# Patient Record
Sex: MALE | URBAN - METROPOLITAN AREA
[De-identification: names, ages, dates, MRNs, and addresses within clinical notes are randomized per-mention and may not be internally consistent; named-entity substitution may affect disease eponyms.]

---

## 2024-11-07 ENCOUNTER — OFFICE VISIT (OUTPATIENT)
Dept: URGENT CARE | Age: 64
End: 2024-11-07

## 2024-11-07 VITALS
HEART RATE: 63 BPM | OXYGEN SATURATION: 93 % | TEMPERATURE: 98.3 F | SYSTOLIC BLOOD PRESSURE: 134 MMHG | WEIGHT: 220 LBS | RESPIRATION RATE: 18 BRPM | DIASTOLIC BLOOD PRESSURE: 77 MMHG

## 2024-11-07 DIAGNOSIS — L03.213 PERIORBITAL CELLULITIS OF RIGHT EYE: ICD-10-CM

## 2024-11-07 DIAGNOSIS — H00.011 HORDEOLUM EXTERNUM OF RIGHT UPPER EYELID: Primary | ICD-10-CM

## 2024-11-07 PROCEDURE — 99203 OFFICE O/P NEW LOW 30 MIN: CPT | Performed by: STUDENT IN AN ORGANIZED HEALTH CARE EDUCATION/TRAINING PROGRAM

## 2024-11-07 PROCEDURE — 1036F TOBACCO NON-USER: CPT | Performed by: STUDENT IN AN ORGANIZED HEALTH CARE EDUCATION/TRAINING PROGRAM

## 2024-11-07 RX ORDER — METFORMIN HYDROCHLORIDE 500 MG/1
500 TABLET ORAL
COMMUNITY

## 2024-11-07 RX ORDER — ESCITALOPRAM OXALATE 5 MG/5ML
10 SOLUTION ORAL DAILY
COMMUNITY

## 2024-11-07 RX ORDER — EMTRICITABINE AND TENOFOVIR DISOPROXIL FUMARATE 200; 300 MG/1; MG/1
1 TABLET, FILM COATED ORAL DAILY
COMMUNITY

## 2024-11-07 RX ORDER — OLMESARTAN MEDOXOMIL 5 MG/1
5 TABLET ORAL DAILY
COMMUNITY

## 2024-11-07 RX ORDER — INSULIN GLARGINE 100 [IU]/ML
INJECTION, SOLUTION SUBCUTANEOUS EVERY 24 HOURS
COMMUNITY

## 2024-11-07 RX ORDER — CEPHALEXIN 500 MG/1
500 CAPSULE ORAL 2 TIMES DAILY
Qty: 14 CAPSULE | Refills: 0 | Status: SHIPPED | OUTPATIENT
Start: 2024-11-07 | End: 2024-11-08

## 2024-11-07 RX ORDER — METOPROLOL SUCCINATE 25 MG/1
25 TABLET, EXTENDED RELEASE ORAL DAILY
COMMUNITY

## 2024-11-07 RX ORDER — SOTALOL HYDROCHLORIDE 80 MG/1
TABLET ORAL
COMMUNITY

## 2024-11-07 RX ORDER — OFLOXACIN 3 MG/ML
1 SOLUTION/ DROPS OPHTHALMIC 4 TIMES DAILY
Qty: 10 ML | Refills: 0 | Status: SHIPPED | OUTPATIENT
Start: 2024-11-07 | End: 2024-11-14

## 2024-11-07 RX ORDER — EZETIMIBE 10 MG/1
10 TABLET ORAL DAILY
COMMUNITY

## 2024-11-07 RX ORDER — NITROGLYCERIN 0.3 MG/1
0.3 TABLET SUBLINGUAL EVERY 5 MIN PRN
COMMUNITY

## 2024-11-07 RX ORDER — ROSUVASTATIN CALCIUM 5 MG/1
5 TABLET, COATED ORAL DAILY
COMMUNITY

## 2024-11-07 RX ORDER — COLCHICINE 0.6 MG/1
TABLET ORAL DAILY
COMMUNITY

## 2024-11-07 RX ORDER — ASPIRIN 81 MG/1
81 TABLET ORAL DAILY
COMMUNITY

## 2024-11-07 RX ORDER — INSULIN LISPRO 100 [IU]/ML
INJECTION, SUSPENSION SUBCUTANEOUS
COMMUNITY

## 2024-11-07 RX ORDER — PIOGLITAZONEHYDROCHLORIDE 15 MG/1
15 TABLET ORAL DAILY
COMMUNITY

## 2024-11-07 NOTE — PATIENT INSTRUCTIONS
Follow up with PCP and established Ophthalmologist. We advised seeking immediate emergency medical attention if symptoms fail to improve, worsen or any concerning symptoms arise. Patient voiced full understanding and agreement to plan.

## 2024-11-07 NOTE — PROGRESS NOTES
Subjective   Patient ID: Sohan Everett is a 63 y.o. male. They present today with a chief complaint of Other (Stye on right eye x2 weeks ).    History of Present Illness  Sohan is a 63-year-old male who presents to the urgent care for evaluation of right eye redness and possible stye formation for 2 weeks duration.  Patient states he has an established eye specialist who he follows with for this back in [place] however he is in the area for work and has not been able to follow-up with his specialist for treatment.  Patient states he gets these often and this 1 is lingering.  Patient describes purulent drainage with the right upper eyelid swelling and redness and tenderness.  Patient denies visual disturbances, fever or facial pain or difficulty moving the eye.  Patient is seeking evaluation reassurance.  No trauma reported.  No other symptoms or concerns otherwise reported.    Past Medical History  Allergies as of 11/07/2024 - Reviewed 11/07/2024   Allergen Reaction Noted    Gabapentin Other 11/07/2024    Sulfa (sulfonamide antibiotics) Hives 11/07/2024       (Not in a hospital admission)       History reviewed. No pertinent past medical history.    History reviewed. No pertinent surgical history.     reports that he has never smoked. He has never been exposed to tobacco smoke. He has never used smokeless tobacco.    Review of Systems  A 10-point review of systems was performed, otherwise unremarkable unless stated in the history of present illness.                Objective    Vitals:    11/07/24 1640   BP: 134/77   Pulse: 63   Resp: 18   Temp: 36.8 °C (98.3 °F)   SpO2: 93%   Weight: 99.8 kg (220 lb)     No LMP for male patient.    Gen: Vitals noted and reviewed, no evidence of acute distress, well developed and afebrile.   Psych: Mood and affect appropriate for setting.  Head/Face: Atraumatic and normocephalic.   Neuro: No focal deficits noted.  Eyes: Right eyelid with erythema and papule of upper lid  with surrounding erythema periorbitally. No erythema involving the septum. No vesicles or pustules noted. Purulent drainage noted on right without hemorrhage. EOMI. Conjunctival injection on the right. No icterus noted. Left eye within normal limits  ENT: TMs clear bilaterally, EACs unremarkable. Mastoids non-tender. Posterior oropharynx without erythema, exudate, or swelling. Uvula is in the midline and non-edematous.   Neck: Supple. No meningismus through full range of motion. No lymphadenopathy.   Cardiac: Regular rate and rhythm no murmur.   Lungs: Clear to auscultation throughout, No evidence of wheezing, rhonchi or crackles. Good aeration throughout.   Extremities: Symmetrical, No peripheral edema  Skin: Without evidence of ecchymosis, wounds, or rashes.      Point of Care Test & Imaging Results from this visit  No results found for this visit on 11/07/24.   No results found.    Diagnostic study results (if any) were reviewed by Yolanda Wong DO.    Assessment/Plan   Allergies, medications, history, and pertinent labs/EKGs/Imaging reviewed by Yolanda Wong DO.     Medical Decision Making  Discussed with the patient symptoms and clinical presentation findings suggestive of right upper eyelid hordeolum/stye with questionable developing periorbital cellulitis.  Reviewed patient medication allergies and prescribed ofloxacin for ophthalmic use along with cephalexin orally.  We advised patient to monitor symptoms closely and to seek immediate emergency medical attention if eye pain, visual disturbances or difficulty moving the eye develops.  We also advise close follow-up with established primary care provider and established ophthalmologist for reassessment and definitive management. We advised seeking immediate emergency medical attention if symptoms fail to improve, worsen or any concerning symptoms arise. Patient voiced full understanding and agreement to plan.      Orders and Diagnoses  Diagnoses and all orders  for this visit:  Hordeolum externum of right upper eyelid  -     ofloxacin (Ocuflox) 0.3 % ophthalmic solution; Administer 1 drop into the right eye 4 times a day for 7 days.  Periorbital cellulitis of right eye  -     ofloxacin (Ocuflox) 0.3 % ophthalmic solution; Administer 1 drop into the right eye 4 times a day for 7 days.  -     cephalexin (Keflex) 500 mg capsule; Take 1 capsule (500 mg) by mouth 2 times a day for 10 days.      Medical Admin Record      Patient disposition: Home    Electronically signed by Yolanda Wong DO  5:22 PM

## 2024-11-08 RX ORDER — CEPHALEXIN 500 MG/1
500 CAPSULE ORAL 2 TIMES DAILY
Qty: 14 CAPSULE | Refills: 0 | Status: SHIPPED | OUTPATIENT
Start: 2024-11-08 | End: 2024-11-18